# Patient Record
Sex: MALE | Race: AMERICAN INDIAN OR ALASKA NATIVE
[De-identification: names, ages, dates, MRNs, and addresses within clinical notes are randomized per-mention and may not be internally consistent; named-entity substitution may affect disease eponyms.]

---

## 2021-12-16 ENCOUNTER — HOSPITAL ENCOUNTER (EMERGENCY)
Dept: HOSPITAL 5 - ED | Age: 36
Discharge: HOME | End: 2021-12-16
Payer: SELF-PAY

## 2021-12-16 VITALS — SYSTOLIC BLOOD PRESSURE: 125 MMHG | DIASTOLIC BLOOD PRESSURE: 78 MMHG

## 2021-12-16 DIAGNOSIS — R05.9: ICD-10-CM

## 2021-12-16 DIAGNOSIS — R51.9: ICD-10-CM

## 2021-12-16 DIAGNOSIS — M79.18: ICD-10-CM

## 2021-12-16 DIAGNOSIS — Z79.899: ICD-10-CM

## 2021-12-16 DIAGNOSIS — B34.9: Primary | ICD-10-CM

## 2021-12-16 LAB
ALBUMIN SERPL-MCNC: 4.1 G/DL (ref 3.9–5)
ALT SERPL-CCNC: 14 UNITS/L (ref 7–56)
BASOPHILS # (AUTO): 0.1 K/MM3 (ref 0–0.1)
BASOPHILS NFR BLD AUTO: 0.9 % (ref 0–1.8)
BUN SERPL-MCNC: 10 MG/DL (ref 9–20)
BUN/CREAT SERPL: 9 %
CALCIUM SERPL-MCNC: 9 MG/DL (ref 8.4–10.2)
EOSINOPHIL # BLD AUTO: 0.1 K/MM3 (ref 0–0.4)
EOSINOPHIL NFR BLD AUTO: 0.9 % (ref 0–4.3)
HCT VFR BLD CALC: 47.1 % (ref 35.5–45.6)
HEMOLYSIS INDEX: 13
HGB BLD-MCNC: 15.3 GM/DL (ref 11.8–15.2)
LYMPHOCYTES # BLD AUTO: 0.7 K/MM3 (ref 1.2–5.4)
LYMPHOCYTES NFR BLD AUTO: 7.6 % (ref 13.4–35)
MCHC RBC AUTO-ENTMCNC: 32 % (ref 32–34)
MCV RBC AUTO: 91 FL (ref 84–94)
MONOCYTES # (AUTO): 1.3 K/MM3 (ref 0–0.8)
MONOCYTES % (AUTO): 14.1 % (ref 0–7.3)
PLATELET # BLD: 386 K/MM3 (ref 140–440)
RBC # BLD AUTO: 5.16 M/MM3 (ref 3.65–5.03)

## 2021-12-16 PROCEDURE — 99284 EMERGENCY DEPT VISIT MOD MDM: CPT

## 2021-12-16 PROCEDURE — 71046 X-RAY EXAM CHEST 2 VIEWS: CPT

## 2021-12-16 PROCEDURE — 80053 COMPREHEN METABOLIC PANEL: CPT

## 2021-12-16 PROCEDURE — 87400 INFLUENZA A/B EACH AG IA: CPT

## 2021-12-16 PROCEDURE — 96372 THER/PROPH/DIAG INJ SC/IM: CPT

## 2021-12-16 PROCEDURE — 36415 COLL VENOUS BLD VENIPUNCTURE: CPT

## 2021-12-16 PROCEDURE — 85025 COMPLETE CBC W/AUTO DIFF WBC: CPT

## 2021-12-16 NOTE — EMERGENCY DEPARTMENT REPORT
- General


Chief Complaint: Headache


Stated Complaint: HA, FEVER


Time Seen by Provider: 21 17:06


Source: patient


Mode of arrival: Ambulatory


Limitations: No Limitations





- History of Present Illness


Initial Comments: 


36-year-old male presents to the ER today with complaints of flulike symptoms.  

Patient states that his symptoms started this morning.  Patient is actually here

with 3 additional family members presented with similar symptoms.  Reports 

headache, mild cough, diffuse body aches, and chills.  He reports no wheezing, 

shortness of breath, GI or  symptoms.  He states that he did have the Pfizer 

vaccine back in July.  He denies any recent travel.  He has no significant past 

medical history.  He admits to marijuana use but denies any tobacco use.  He 

denies any illicit drug use.


MD Complaint: cough





- Related Data


                                  Previous Rx's











 Medication  Instructions  Recorded  Last Taken  Type


 


Ibuprofen [Motrin] 800 mg PO Q8HR PRN #30 tablet 21 Unknown Rx











                                    Allergies











Allergy/AdvReac Type Severity Reaction Status Date / Time


 


No Known Allergies Allergy   Verified 21 17:55














ED Review of Systems


ROS: 


Stated complaint: HA, FEVER


Other details as noted in HPI





Comment: All other systems reviewed and negative


Constitutional: chills, fever


ENT: denies: throat pain, dental pain, hearing loss, epistaxis, congestion


Respiratory: cough.  denies: orthopnea, shortness of breath, SOB with exertion, 

SOB at rest, wheezing


Cardiovascular: denies: chest pain, palpitations, dyspnea on exertion, edema, 

syncope, paroxysmal nocturnal dyspnea


Gastrointestinal: denies: abdominal pain, nausea, vomiting, diarrhea, 

constipation, hematemesis, hematochezia


Genitourinary: denies: urgency, dysuria, frequency, hematuria, discharge, 

testicular pain, testicular mass


Musculoskeletal: denies: back pain, joint swelling, arthralgia


Skin: denies: rash, lesions, change in color, change in hair/nails, pruritus


Neurological: headache.  denies: weakness, numbness, paresthesias, confusion, a

bnormal gait, vertigo


Psychiatric: denies: anxiety, depression, auditory hallucinations, visual 

hallucinations, homicidal thoughts, suicidal thoughts


Hematological/Lymphatic: denies: easy bleeding, easy bruising, swollen glands





ED Past Medical Hx





- Past Medical History


Previous Medical History?: No





- Surgical History


Past Surgical History?: No





- Medications


Home Medications: 


                                Home Medications











 Medication  Instructions  Recorded  Confirmed  Last Taken  Type


 


Ibuprofen [Motrin] 800 mg PO Q8HR PRN #30 tablet 21  Unknown Rx














ED Physical Exam





- General


Limitations: No Limitations


General appearance: alert, in distress (Patient appears uncomfortable, mildly 

ill-appearing but overall not toxic)





- Head


Head exam: Present: atraumatic, normocephalic, normal inspection





- Eye


Eye exam: Present: normal appearance, PERRL, EOMI


Pupils: Present: normal accommodation





- ENT


ENT exam: Present: normal exam, mucous membranes moist, TM's normal bilaterally





- Neck


Neck exam: Present: normal inspection, full ROM.  Absent: meningismus





- Respiratory


Respiratory exam: Present: normal lung sounds bilaterally.  Absent: respiratory 

distress, wheezes, rales, rhonchi





- Cardiovascular


Cardiovascular Exam: Present: regular rate, normal rhythm, normal heart sounds





- GI/Abdominal


GI/Abdominal exam: Present: soft.  Absent: distended, tenderness, guarding, 

rebound





- Neurological Exam


Neurological exam: Present: alert, oriented X3, CN II-XII intact, normal gait





- Psychiatric


Psychiatric exam: Present: normal affect, normal mood





- Skin


Skin exam: Present: intact





ED Course


                                   Vital Signs











  21





  16:30 18:57


 


Temperature 101.4 F H 98.7 F


 


Pulse Rate 111 H 102 H


 


Respiratory 18 17





Rate  


 


Blood Pressure 146/93 





[Right]  


 


O2 Sat by Pulse 100 99





Oximetry  














ED Medical Decision Making





- Lab Data


Result diagrams: 


                                 21 17:41





                                 21 17:41





- Radiology Data


Radiology results: report reviewed


Patient: MICHAEL JACQUES                                                        

        MR#: [id number]  


280          


: 1985                                                                

Acct:[id number]      


 


Age/Sex: 36 / M                                                                

ADM Date: 21     


 


Loc: ED       


Attending Dr:   


 


 


Ordering Physician: JOE TREVIÑO  


Date of Service: 21  


Procedure(s): XR chest routine 2V  


Accession Number(s): [id number]  


 


cc: JOE TREVIÑO   


 


Fluoro Time In Minutes:   


 


CHEST PA AND LATERAL VIEWS  


 


 INDICATION:   


 fever/couhg.  


 


 COMPARISON:   


 None.  


 


 FINDINGS:  


 


 Support devices: None.  


 Heart: Within normal limits.   


 Lungs/Pleura: No acute pulmonary or pleural findings.    


 


 


 


 IMPRESSION:  


 1. No acute findings.  


 


 Signer Name: Brendan Olvera MD   


 Signed: 2021 6:01 PM  


 Workstation Name: [id number]   


 


 


Transcribed By: ALEXANDRU  


Dictated By: Brendan Olvera MD  


Electronically Authenticated By: Brendan Olvera MD    


Signed Date/Time: 21                                


 


 


 


DD/DT: 21                                                            

  


TD/TT:








- Medical Decision Making


He reports feeling much better after meds.  Repeat vital signs show improvement 

of his temperature and his heart rate, and remaining vital signs have been 

stable.  Rapid flu negative.  Chest x-ray shows no acute abnormalities.  CBC and

 CMP unremarkable.


Patient currently appears to be feeling much better.  He is not toxic, is not in

 significant distress, he is neurologically intact with a normal gait.  He has 

no meningeal signs on exam.  He is not in any respiratory distress.  Abdomen 

soft and nontender.  Discussed results with patient.  Suspect nonspecific viral 

syndrome at this time, but I did recommend to patient who is also here with his 

additional 3 family members who presented with similar symptoms that they get a 

COVID-19 test as this could still be a possibility of his symptoms.  Patient 

will be given medication for his symptoms.  Patient expressed understanding for 

instructions and agree with plan.  Patient was stable at time of discharge.


Critical care attestation.: 


If time is entered above; I have spent that time in minutes in the direct care 

of this critically ill patient, excluding procedure time.








ED Disposition


Clinical Impression: 


 Viral syndrome





Disposition:  HOME / SELF CARE / HOMELESS


Is pt being admited?: No


Does the pt Need Aspirin: No


Condition: Stable


Instructions:  Viral Illness, Adult


Additional Instructions: 


I recommend taking Tylenol and ibuprofen as needed to help with any pain or 

fever.  I also recommend that you get a COVID-19 test, as this could still be 

the cause of your symptoms.  You can follow-up with any local pharmacy or urgent

 care to get it done.  You can take over-the-counter cough cold medications to 

help with symptoms.  Drink lots of fluids.  Follow-up closely with your PCP.  

Return to the ER if your symptoms worsens or changes in any way.


Prescriptions: 


Ibuprofen [Motrin] 800 mg PO Q8HR PRN #30 tablet


 PRN Reason: pain


Referrals: 


PRIMARY CARE,MD [Primary Care Provider] - 3-5 Days


Forms:  Work/School Release Form(ED)


Time of Disposition: 19:04

## 2021-12-16 NOTE — XRAY REPORT
CHEST PA AND LATERAL VIEWS



INDICATION: 

fever/couhg.



COMPARISON: 

None.



FINDINGS:



Support devices: None.

Heart: Within normal limits. 

Lungs/Pleura: No acute pulmonary or pleural findings.  







IMPRESSION:

1. No acute findings.



Signer Name: Brendan Olvera MD 

Signed: 12/16/2021 6:01 PM

Workstation Name: [id number]

## 2021-12-29 ENCOUNTER — HOSPITAL ENCOUNTER (EMERGENCY)
Dept: HOSPITAL 5 - ED | Age: 36
Discharge: HOME | End: 2021-12-29
Payer: SELF-PAY

## 2021-12-29 VITALS — DIASTOLIC BLOOD PRESSURE: 81 MMHG | SYSTOLIC BLOOD PRESSURE: 124 MMHG

## 2021-12-29 DIAGNOSIS — S16.1XXA: Primary | ICD-10-CM
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
## 2021-12-29 PROCEDURE — 99283 EMERGENCY DEPT VISIT LOW MDM: CPT

## 2021-12-29 PROCEDURE — 72040 X-RAY EXAM NECK SPINE 2-3 VW: CPT
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
